# Patient Record
Sex: MALE | Race: WHITE | NOT HISPANIC OR LATINO | URBAN - METROPOLITAN AREA
[De-identification: names, ages, dates, MRNs, and addresses within clinical notes are randomized per-mention and may not be internally consistent; named-entity substitution may affect disease eponyms.]

---

## 2017-05-03 ENCOUNTER — EMERGENCY (EMERGENCY)
Facility: HOSPITAL | Age: 22
LOS: 0 days | Discharge: HOME | End: 2017-05-03

## 2017-06-28 DIAGNOSIS — S80.812A ABRASION, LEFT LOWER LEG, INITIAL ENCOUNTER: ICD-10-CM

## 2017-06-28 DIAGNOSIS — Z88.0 ALLERGY STATUS TO PENICILLIN: ICD-10-CM
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
## 2017-06-28 DIAGNOSIS — M25.572 PAIN IN LEFT ANKLE AND JOINTS OF LEFT FOOT: ICD-10-CM
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
## 2017-06-28 DIAGNOSIS — S80.12XA CONTUSION OF LEFT LOWER LEG, INITIAL ENCOUNTER: ICD-10-CM

## 2021-11-08 ENCOUNTER — EMERGENCY (EMERGENCY)
Facility: HOSPITAL | Age: 26
LOS: 0 days | Discharge: HOME | End: 2021-11-08
Attending: EMERGENCY MEDICINE | Admitting: EMERGENCY MEDICINE
Payer: COMMERCIAL

## 2021-11-08 VITALS
TEMPERATURE: 98 F | DIASTOLIC BLOOD PRESSURE: 86 MMHG | SYSTOLIC BLOOD PRESSURE: 124 MMHG | OXYGEN SATURATION: 99 % | WEIGHT: 164.91 LBS | RESPIRATION RATE: 20 BRPM | HEART RATE: 67 BPM

## 2021-11-08 DIAGNOSIS — Z88.2 ALLERGY STATUS TO SULFONAMIDES: ICD-10-CM

## 2021-11-08 DIAGNOSIS — S09.93XA UNSPECIFIED INJURY OF FACE, INITIAL ENCOUNTER: ICD-10-CM
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
## 2021-11-08 DIAGNOSIS — S00.33XA CONTUSION OF NOSE, INITIAL ENCOUNTER: ICD-10-CM
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
## 2021-11-08 PROCEDURE — 99283 EMERGENCY DEPT VISIT LOW MDM: CPT

## 2021-11-08 NOTE — ED PROVIDER NOTE - NS ED ROS FT
Constitutional: (-) fever (-) chills (-) (-) lightheadedness   Eyes/ENT: (-) blurry vision, (-) epistaxis (-) rhinorrhea (-) nasal congestion (+) nose pain   Cardiovascular: (-) chest pain, (-) syncope (-) palpitations   Respiratory: (-) cough, (-) shortness of breath (-) pleurisy   Gastrointestinal: (-) vomiting, (-) diarrhea (-) abdominal pain (-) nausea (-) anorexia  Musculoskeletal: (-) neck pain, (-) back pain, (-) joint pain (-) joint swelling (-) painful ROM  Integumentary: (-) rash, (-) edema (-) lacerations (-) pruritis   Neurological: (-) headache, (-) altered mental status (-) LOC (-) dizziness (-) paresthesias (-) gait abnormalities

## 2021-11-08 NOTE — ED PROVIDER NOTE - CLINICAL SUMMARY MEDICAL DECISION MAKING FREE TEXT BOX
pt with nasal bone contusion, no epistaxis or septal hematoma, perrla eomi +inflammed turbs on left, will d/c supprotive care, ent f/u

## 2021-11-08 NOTE — ED PROVIDER NOTE - PHYSICAL EXAMINATION
Physical Exam    Vital Signs: I have reviewed the initial vital signs.  Constitutional: well-nourished, appears stated age, no acute distress  Eyes: Conjunctiva pink, Sclera clear, PERRLA, EOMI, no ptosis, no entrapment, no racoon eyes.  Nose: No septalhematoma, no epistaxis, no obvious deformity   Musculoskeletal: no gross bony deformities or swelling.  Integumentary: warm, dry, no rashes, lacerations, no ecchymosis  Neurologic: awake, alert, cranial nerves II-XII grossly intact, extremities’ motor and sensory functions grossly intact, no nystagmus, tremors, fasiculationsm facial droop, no ataxia, no dysmetria  Psychiatric: appropriate mood, appropriate affect x3, non ataxic gait ,

## 2021-11-08 NOTE — ED ADULT TRIAGE NOTE - CHIEF COMPLAINT QUOTE
"I think my nose is broken. I was playing hockey and my teammate landed on me. This happened on Sat afternoon"

## 2021-11-08 NOTE — ED PROVIDER NOTE - PATIENT PORTAL LINK FT
You can access the FollowMyHealth Patient Portal offered by [facility] by registering at the following website: [email or web address]. By joining [employer]’s FollowMyHealth portal, you will also be able to view your health information using other applications (apps) compatible with our system.

## 2021-11-08 NOTE — ED PROVIDER NOTE - NSFOLLOWUPINSTRUCTIONS_ED_ALL_ED_FT
MAKE AN APPOINTMENT WITH ENT DOCTOR WITHIN 1-3 DAYS OF YOUR ER VISIT.    Nasal Fracture    A nasal fracture is a break or crack in the bones or cartilage of the nose. Minor breaks do not require treatment. These breaks usually heal on their own after about one month. Serious breaks may require surgery.     CAUSES  This injury is usually caused by a blunt injury to the nose. This type of injury often occurs from:    Contact sports.  Car accidents.  Falls.  Getting punched.    SYMPTOMS  Symptoms of this injury include:    Pain.  Swelling of the nose.  Bleeding from the nose.  Bruising around the nose or eyes. This may include having black eyes.  Crooked appearance of the nose.    DIAGNOSIS  This injury may be diagnosed with a physical exam. The health care provider will gently feel the nose for signs of broken bones. He or she will look inside the nostrils to make sure that there is not a blood-filled swelling on the dividing wall between the nostrils (septal hematoma). X-rays of the nose may not show a nasal fracture even when one is present. In some cases, X-rays or a CT scan may be done 1–5 days after the injury. Sometimes, the health care provider will want to wait until the swelling has gone down.    TREATMENT  Often, minor fractures that have caused no deformity do not require treatment. More serious fractures in which bones have moved out of position may require surgery, which will take place after the swelling is gone. Surgery will stabilize and align the fracture. In some cases, a health care provider may be able to reposition the bones without surgery. This may be done in the health care provider's office after medicine is given to numb the area (local anesthetic).    HOME CARE INSTRUCTIONS  If directed, apply ice to the injured area:  Put ice in a plastic bag.  Place a towel between your skin and the bag.  Leave the ice on for 20 minutes, 2–3 times per day.  Take over-the-counter and prescription medicines only as told by your health care provider.  If your nose starts to bleed, sit in an upright position while you squeeze the soft parts of your nose against the dividing wall between your nostrils (septum) for 10 minutes.  Try to avoid blowing your nose.  Return to your normal activities as told by your health care provider. Ask your health care provider what activities are safe for you.  Avoid contact sports for 3–4 weeks or as told by your health care provider.  Keep all follow-up visits as told by your health care provider. This is important.    SEEK MEDICAL CARE IF:  Your pain increases or becomes severe.  You continue to have nosebleeds.  The shape of your nose does not return to normal within 5 days.  You have pus draining out of your nose.    SEEK IMMEDIATE MEDICAL CARE IF:  You have bleeding from your nose that does not stop after you pinch your nostrils closed for 20 minutes and keep ice on your nose.  You have clear fluid draining out of your nose.  You notice a grape-like swelling on the septum. This swelling is a collection of blood (hematoma) that must be drained to help prevent infection.  You have difficulty moving your eyes.  You have repeated vomiting.    ADDITIONAL NOTES AND INSTRUCTIONS    Please follow up with your Primary MD in 24-48 hr.  Seek immediate medical care for any new/worsening signs or symptoms.

## 2021-11-08 NOTE — ED PROVIDER NOTE - OBJECTIVE STATEMENT
26 y.o. male [facility] here for eval of nose. Was struck in face with opponents chest while playing hockey. NoHA dizziness LOC, ac usage , N/V, visual changes neck pain or paresthesias. Feels somediscomfort localized to nose. No epistaxis.

## 2021-11-08 NOTE — ED PROVIDER NOTE - CARE PROVIDER_API CALL
Justin Corrales)  Otolaryngology  [address] [place] [address], [address] [place] [address]  Phone: [phone]  Fax: [phone]  Follow Up Time:

## 2022-06-28 PROBLEM — Z78.9 OTHER SPECIFIED HEALTH STATUS: Chronic | Status: ACTIVE | Noted: 2021-11-08

## 2022-06-28 PROBLEM — Z00.00 ENCOUNTER FOR PREVENTIVE HEALTH EXAMINATION: Status: ACTIVE | Noted: 2022-06-28

## 2022-07-08 ENCOUNTER — APPOINTMENT (OUTPATIENT)
Dept: ORTHOPEDIC SURGERY | Facility: CLINIC | Age: 27
End: 2022-07-08

## 2022-07-08 DIAGNOSIS — S82.51XD DISPLACED FRACTURE OF MEDIAL MALLEOLUS OF RIGHT TIBIA, SUBSEQUENT ENCOUNTER FOR CLOSED FRACTURE WITH ROUTINE HEALING: ICD-10-CM

## 2022-07-08 PROCEDURE — 99024 POSTOP FOLLOW-UP VISIT: CPT

## 2022-07-08 PROCEDURE — 73590 X-RAY EXAM OF LOWER LEG: CPT | Mod: RT

## 2022-07-08 NOTE — IMAGING
[de-identified] : Otherwise healthy young man sits comfortably my office no distress.\par Physical examination\par -right foot ankle:  No significant tenderness palpation over the medial malleolus.  No tenderness along the joint line anteriorly.  Negative anterior drawer test.  Provocative testing with forced inversion and eversion and neutral dorsiflexion and plantar flexion does not with undue discomfort although some mild with the foot dorsiflexed and inverted.  Arch is maintained midfoot remains supple.  Calf is soft no cords.  Normal sensation dorsal plantar aspect of foot.  2+ DP pulses.  No callosities or ulcers.\par \par Radiographs:\par -right tibia (AP, lateral):  Nondisplaced medial malleolar avulsion fracture.  Talus sits within mortise appropriately.  Appropriate tib-fib overlap.  No proximal fibula fracture.  Interval healing noted.

## 2022-07-08 NOTE — HISTORY OF PRESENT ILLNESS
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  Last seen May 24, 2022 he transitioned out of the Cam boot at the end of May.  He has been able to walk and function without any undue discomfort.  Does not require any pain medication reports really no pain.  He is eager to return to work.  Patient does note he has some discomfort with prolonged walking-greater than a mi.  Some tightness with range of motion of his ankle.

## 2022-09-13 NOTE — ASSESSMENT
[FreeTextEntry1] :   26-year-old gentleman 2 months non operative management avulsion fracture right knee medial malleolar avulsion fracture which is healing uneventfully.  I am happy to provide him with a prescription for physical therapy which would focus on ankle strengthening gait training balance and generalized conditioning with modalities utilized adjunct therapy.  Physical therapy can also work to teach him a self-directed exercise program.  Patient reassured this may take upwards of 3-6 months from injury to really resolve.  Would not surprise me that he has some discomfort with prolonged standing or ambulation this will continue to improve over time.  If he does have pain I would recommend over-the-counter nonsteroidal anti-inflammatory medications such as ibuprofen or Aleve.  NSAID precautions discussed.  Alternatively he can try some Tylenol.  I have him check in with us in 3 months to make sure that he is doing well.  If he is still having pain will repeat radiographs of his right ankle.  We will give him a note for work alignment of return to duty without limitations.  If he has any problems or happy to see him back sooner.  All questions answered to his satisfaction. Initiate Treatment: Impoyz 0.025 % topical cream Bid\\nQuantity: 120.0 g  Days Supply: 30\\nSig: AAA on arms, legs, chest, back and feet 1-2 times daily as needed for flare Plan: Patient starting plaquinel today. Continue follow ups with rheumatologist.sutures removed. Continue Regimen: Fluocinonide until impoyz is received\\nHolding omeprazole at this time Detail Level: Zone Render In Strict Bullet Format?: No

## 2022-12-06 ENCOUNTER — APPOINTMENT (OUTPATIENT)
Dept: UROLOGY | Facility: CLINIC | Age: 27
End: 2022-12-06

## 2023-01-10 ENCOUNTER — APPOINTMENT (OUTPATIENT)
Dept: ORTHOPEDIC SURGERY | Facility: CLINIC | Age: 28
End: 2023-01-10

## 2023-01-26 ENCOUNTER — EMERGENCY (EMERGENCY)
Facility: HOSPITAL | Age: 28
LOS: 0 days | Discharge: HOME | End: 2023-01-26
Attending: STUDENT IN AN ORGANIZED HEALTH CARE EDUCATION/TRAINING PROGRAM
Payer: COMMERCIAL

## 2023-01-26 VITALS
WEIGHT: 179.9 LBS | TEMPERATURE: 99 F | SYSTOLIC BLOOD PRESSURE: 113 MMHG | OXYGEN SATURATION: 100 % | HEART RATE: 84 BPM | DIASTOLIC BLOOD PRESSURE: 54 MMHG | RESPIRATION RATE: 18 BRPM

## 2023-01-26 VITALS
RESPIRATION RATE: 19 BRPM | HEART RATE: 75 BPM | DIASTOLIC BLOOD PRESSURE: 52 MMHG | OXYGEN SATURATION: 99 % | SYSTOLIC BLOOD PRESSURE: 106 MMHG | TEMPERATURE: 101 F

## 2023-01-26 DIAGNOSIS — R19.7 DIARRHEA, UNSPECIFIED: ICD-10-CM

## 2023-01-26 DIAGNOSIS — R10.84 GENERALIZED ABDOMINAL PAIN: ICD-10-CM

## 2023-01-26 DIAGNOSIS — R11.2 NAUSEA WITH VOMITING, UNSPECIFIED: ICD-10-CM

## 2023-01-26 DIAGNOSIS — F17.200 NICOTINE DEPENDENCE, UNSPECIFIED, UNCOMPLICATED: ICD-10-CM

## 2023-01-26 DIAGNOSIS — Z20.822 CONTACT WITH AND (SUSPECTED) EXPOSURE TO COVID-19: ICD-10-CM

## 2023-01-26 DIAGNOSIS — Z88.2 ALLERGY STATUS TO SULFONAMIDES: ICD-10-CM

## 2023-01-26 LAB
ALBUMIN SERPL ELPH-MCNC: 4.7 G/DL — SIGNIFICANT CHANGE UP (ref 3.5–5.2)
ALP SERPL-CCNC: 76 U/L — SIGNIFICANT CHANGE UP (ref 30–115)
ALT FLD-CCNC: 25 U/L — SIGNIFICANT CHANGE UP (ref 0–41)
ANION GAP SERPL CALC-SCNC: 13 MMOL/L — SIGNIFICANT CHANGE UP (ref 7–14)
AST SERPL-CCNC: 35 U/L — SIGNIFICANT CHANGE UP (ref 0–41)
BASOPHILS # BLD AUTO: 0.04 K/UL — SIGNIFICANT CHANGE UP (ref 0–0.2)
BASOPHILS NFR BLD AUTO: 0.3 % — SIGNIFICANT CHANGE UP (ref 0–1)
BILIRUB SERPL-MCNC: 0.7 MG/DL — SIGNIFICANT CHANGE UP (ref 0.2–1.2)
BUN SERPL-MCNC: 22 MG/DL — HIGH (ref 10–20)
CALCIUM SERPL-MCNC: 9.7 MG/DL — SIGNIFICANT CHANGE UP (ref 8.4–10.5)
CHLORIDE SERPL-SCNC: 99 MMOL/L — SIGNIFICANT CHANGE UP (ref 98–110)
CO2 SERPL-SCNC: 26 MMOL/L — SIGNIFICANT CHANGE UP (ref 17–32)
CREAT SERPL-MCNC: 1 MG/DL — SIGNIFICANT CHANGE UP (ref 0.7–1.5)
EGFR: 105 ML/MIN/1.73M2 — SIGNIFICANT CHANGE UP
EOSINOPHIL # BLD AUTO: 0.03 K/UL — SIGNIFICANT CHANGE UP (ref 0–0.7)
EOSINOPHIL NFR BLD AUTO: 0.2 % — SIGNIFICANT CHANGE UP (ref 0–8)
FLUAV AG NPH QL: SIGNIFICANT CHANGE UP
FLUBV AG NPH QL: SIGNIFICANT CHANGE UP
GLUCOSE SERPL-MCNC: 89 MG/DL — SIGNIFICANT CHANGE UP (ref 70–99)
HCT VFR BLD CALC: 49.4 % — SIGNIFICANT CHANGE UP (ref 42–52)
HGB BLD-MCNC: 16.9 G/DL — SIGNIFICANT CHANGE UP (ref 14–18)
IMM GRANULOCYTES NFR BLD AUTO: 0.4 % — HIGH (ref 0.1–0.3)
LACTATE SERPL-SCNC: 2.6 MMOL/L — HIGH (ref 0.7–2)
LIDOCAIN IGE QN: 28 U/L — SIGNIFICANT CHANGE UP (ref 7–60)
LYMPHOCYTES # BLD AUTO: 0.49 K/UL — LOW (ref 1.2–3.4)
LYMPHOCYTES # BLD AUTO: 3.3 % — LOW (ref 20.5–51.1)
MCHC RBC-ENTMCNC: 30.5 PG — SIGNIFICANT CHANGE UP (ref 27–31)
MCHC RBC-ENTMCNC: 34.2 G/DL — SIGNIFICANT CHANGE UP (ref 32–37)
MCV RBC AUTO: 89.2 FL — SIGNIFICANT CHANGE UP (ref 80–94)
MONOCYTES # BLD AUTO: 1.03 K/UL — HIGH (ref 0.1–0.6)
MONOCYTES NFR BLD AUTO: 7 % — SIGNIFICANT CHANGE UP (ref 1.7–9.3)
NEUTROPHILS # BLD AUTO: 13.02 K/UL — HIGH (ref 1.4–6.5)
NEUTROPHILS NFR BLD AUTO: 88.8 % — HIGH (ref 42.2–75.2)
NRBC # BLD: 0 /100 WBCS — SIGNIFICANT CHANGE UP (ref 0–0)
PLATELET # BLD AUTO: 238 K/UL — SIGNIFICANT CHANGE UP (ref 130–400)
POTASSIUM SERPL-MCNC: 4.9 MMOL/L — SIGNIFICANT CHANGE UP (ref 3.5–5)
POTASSIUM SERPL-SCNC: 4.9 MMOL/L — SIGNIFICANT CHANGE UP (ref 3.5–5)
PROT SERPL-MCNC: 7.1 G/DL — SIGNIFICANT CHANGE UP (ref 6–8)
RBC # BLD: 5.54 M/UL — SIGNIFICANT CHANGE UP (ref 4.7–6.1)
RBC # FLD: 11.5 % — SIGNIFICANT CHANGE UP (ref 11.5–14.5)
RSV RNA NPH QL NAA+NON-PROBE: SIGNIFICANT CHANGE UP
SARS-COV-2 RNA SPEC QL NAA+PROBE: SIGNIFICANT CHANGE UP
SODIUM SERPL-SCNC: 138 MMOL/L — SIGNIFICANT CHANGE UP (ref 135–146)
WBC # BLD: 14.67 K/UL — HIGH (ref 4.8–10.8)
WBC # FLD AUTO: 14.67 K/UL — HIGH (ref 4.8–10.8)

## 2023-01-26 PROCEDURE — 99285 EMERGENCY DEPT VISIT HI MDM: CPT

## 2023-01-26 PROCEDURE — 74177 CT ABD & PELVIS W/CONTRAST: CPT | Mod: 26,MA

## 2023-01-26 RX ORDER — DIATRIZOATE MEGLUMINE 180 MG/ML
30 INJECTION, SOLUTION INTRAVESICAL ONCE
Refills: 0 | Status: COMPLETED | OUTPATIENT
Start: 2023-01-26 | End: 2023-01-26

## 2023-01-26 RX ORDER — FAMOTIDINE 10 MG/ML
20 INJECTION INTRAVENOUS ONCE
Refills: 0 | Status: COMPLETED | OUTPATIENT
Start: 2023-01-26 | End: 2023-01-26

## 2023-01-26 RX ORDER — ONDANSETRON 8 MG/1
4 TABLET, FILM COATED ORAL ONCE
Refills: 0 | Status: COMPLETED | OUTPATIENT
Start: 2023-01-26 | End: 2023-01-26

## 2023-01-26 RX ORDER — SODIUM CHLORIDE 9 MG/ML
2500 INJECTION, SOLUTION INTRAVENOUS ONCE
Refills: 0 | Status: COMPLETED | OUTPATIENT
Start: 2023-01-26 | End: 2023-01-26

## 2023-01-26 RX ORDER — ACETAMINOPHEN 500 MG
975 TABLET ORAL ONCE
Refills: 0 | Status: COMPLETED | OUTPATIENT
Start: 2023-01-26 | End: 2023-01-26

## 2023-01-26 RX ADMIN — FAMOTIDINE 100 MILLIGRAM(S): 10 INJECTION INTRAVENOUS at 07:20

## 2023-01-26 RX ADMIN — DIATRIZOATE MEGLUMINE 30 MILLILITER(S): 180 INJECTION, SOLUTION INTRAVESICAL at 08:47

## 2023-01-26 RX ADMIN — SODIUM CHLORIDE 2500 MILLILITER(S): 9 INJECTION, SOLUTION INTRAVENOUS at 07:21

## 2023-01-26 RX ADMIN — ONDANSETRON 4 MILLIGRAM(S): 8 TABLET, FILM COATED ORAL at 07:20

## 2023-01-26 RX ADMIN — Medication 975 MILLIGRAM(S): at 08:18

## 2023-01-26 NOTE — ED PROVIDER NOTE - NS ED ROS FT
GEN:  no fever, no chills, no generalized weakness  NEURO:  no headache, no dizziness  ENT: no sore throat, no runny nose  CV:  no chest pain, no palpitations  RESP:  no sob, no cough  GI:  +nausea, +vomiting, +abdominal pain, +diarrhea  :  no dysuria, no urinary frequency, no hematuria  MSK:  no joint pain, no edema  SKIN:  no rash, no bruising

## 2023-01-26 NOTE — ED PROVIDER NOTE - PHYSICAL EXAMINATION
CONSTITUTIONAL: Well-developed; well-nourished; in no acute distress.   SKIN: warm, dry  HEAD: Normocephalic  EYES: no conjunctival injection  NECK: Supple  CARD: S1, S2 normal; Regular rate and rhythm.   RESP: No wheezes, rales or rhonchi.  ABD: soft ntnd. +belching  EXT: Normal ROM.  No clubbing, cyanosis or edema.   NEURO: Alert, oriented, grossly unremarkable.  PSYCH: Cooperative, appropriate.

## 2023-01-26 NOTE — ED PROVIDER NOTE - ATTENDING CONTRIBUTION TO CARE
27-year-old male no reported past medical history presents the emergency department for evaluation of nausea and nonbilious nonbloody vomiting that started 1 AM last night followed by mild diffuse constant generalized abdominal pain. Patient reports eating rotten grated cheese last night. Pt also reporting nonbloody diarrhea. No fever, chest pain, sob, palpitations, diaphoresis, cough, headache, dizziness, numbness/tingling, neck pain/stiffness, constipation, melena/brbpr, urinary symptoms, trauma, weakness, edema, calf pain or swelling, sick contacts, recent travel or rash.     Vital Signs: I have reviewed the initial vital signs.  Constitutional: Patient in no acute distress.  Integumentary: No rash.  ENT: MMM  NECK: Supple.   Cardiovascular: RRR, radial pulses 2/4 bilaterally.   Respiratory: Breath sounds CTA b/l, no wheezing or crackles, good air exchange, good resp effort and excursion, no accessory muscle use, no stridor.  Gastrointestinal: Abdomen soft, +diffuse ttp, non-distended, no rebound tenderness or guarding, no CVAT.   Musculoskeletal: FROM, no edema, no calf pain/swelling/erythema.  Neurologic: awake and alert, follows commands no FND.

## 2023-01-26 NOTE — ED PROVIDER NOTE - PATIENT PORTAL LINK FT
You can access the FollowMyHealth Patient Portal offered by [facility] by registering at the following website: [email or web address]. By joining [employer]’s FollowMyHealth portal, you will also be able to view your health information using other applications (apps) compatible with our system.

## 2023-01-26 NOTE — ED PROVIDER NOTE - NSFOLLOWUPCLINICS_GEN_ALL_ED_FT
A Family Medicine Doctor  Family Medicine  .  [place]   Phone:   Fax:   Follow Up Time: Routine    A Gastroenterologist  Gastroenterology  .  [place]   Phone:   Fax:   Follow Up Time: Routine

## 2023-01-26 NOTE — ED PROVIDER NOTE - CLINICAL SUMMARY MEDICAL DECISION MAKING FREE TEXT BOX
27-year-old male presented with abdominal pain nausea vomiting and diarrhea. Labs ordered and reviewed.  Imaging was ordered and reviewed by me.  Appropriate medications for patient's presenting complaints were ordered and effects were reassessed.  Escalation to admission/observation was considered. However patient feels much better and is comfortable with discharge. Tolerating PO in ED. Appropriate follow-up was arranged. Patient to be discharged from ED. Any available test results were discussed with patient and/or family. Verbal instructions given, including instructions to return to ED immediately for any new, worsening, or concerning symptoms. Patient endorsed understanding. Written discharge instructions additionally given, including follow-up plan.

## 2023-01-26 NOTE — ED ADULT NURSE REASSESSMENT NOTE - NS ED NURSE REASSESS COMMENT FT1
Pt c/o diffuse abdominal pain , nausea , vomiting , diarrhea since 0100 today per patient , peripheral IV inserted , lab drawn done and sent to lab , awaiting for orders , awaiting to be seen by provider , AO x 4 , denies chest pain , denies headache , denies dizziness ,, no SOB, family at the bedside
pt feels much better at the time of discharge , vss

## 2023-01-26 NOTE — ED PROVIDER NOTE - OBJECTIVE STATEMENT
28 yo male, no PMHx, presents with sudden onset nausea and NBNB vomiting at 1 am last night, mildly alleviated on its own, no aggravating factors, associated with multiple episodes of watery diarrhea and generalized abdominal pain. Denies fevers, chills, cough, chest pain, shortness of breath, headache, sick contacts, history of surgeries.

## 2023-09-14 NOTE — ED ADULT TRIAGE NOTE - NS ED NURSE BANDS TYPE
Name band; Dorsal Nasal Flap Text: The defect edges were debeveled with a #15 scalpel blade.  Given the location of the defect and the proximity to free margins a dorsal nasal flap was deemed most appropriate.  Using a sterile surgical marker, an appropriate dorsal nasal flap was drawn around the defect.    The area thus outlined was incised deep to adipose tissue with a #15 scalpel blade.  The skin margins were undermined to an appropriate distance in all directions utilizing iris scissors.